# Patient Record
(demographics unavailable — no encounter records)

---

## 2025-01-22 NOTE — REVIEW OF SYSTEMS
[Sore Throat] : sore throat [Postnasal Drip] : postnasal drip [Shortness Of Breath] : shortness of breath [Cough] : cough [Negative] : Constitutional [Earache] : no earache [Wheezing] : no wheezing [FreeTextEntry6] : mild SOB

## 2025-01-22 NOTE — HISTORY OF PRESENT ILLNESS
[FreeTextEntry8] : Pt comes in to est care and for sick visit. Pt has a cough and a fever. Pt thinks she has COVID. Symptoms started 2 days ago. + subjective fever. + asthma, feeling short of breath. She had COVID a few months ago too. Sore throat too. Ears are bothering her a little. Cough is sometimes productive. Hasn't noticed any wheezing. + body aches and fatigue.

## 2025-01-22 NOTE — PHYSICAL EXAM
[Normal Sclera/Conjunctiva] : normal sclera/conjunctiva [Normal Outer Ear/Nose] : the outer ears and nose were normal in appearance [Normal TMs] : both tympanic membranes were normal [No Lymphadenopathy] : no lymphadenopathy [Supple] : supple [Normal] : no respiratory distress, lungs were clear to auscultation bilaterally and no accessory muscle use [No Extremity Clubbing/Cyanosis] : no extremity clubbing/cyanosis [Normal Affect] : the affect was normal [Normal Mood] : the mood was normal [de-identified] : moderately erythematous oropharynx

## 2025-01-22 NOTE — ASSESSMENT
[FreeTextEntry1] : # Viral URI Rapid flu neg Rapid COVID test dysfunctional f/u resp PCR  # HM f/u AV labs - home draw Overdue for physical  f/u for formal CPE